# Patient Record
Sex: FEMALE | Race: WHITE | NOT HISPANIC OR LATINO | URBAN - METROPOLITAN AREA
[De-identification: names, ages, dates, MRNs, and addresses within clinical notes are randomized per-mention and may not be internally consistent; named-entity substitution may affect disease eponyms.]

---

## 2020-07-01 ENCOUNTER — OFFICE VISIT (OUTPATIENT)
Dept: URBAN - METROPOLITAN AREA CLINIC 115 | Facility: CLINIC | Age: 65
End: 2020-07-01
Payer: MEDICARE

## 2020-07-01 ENCOUNTER — DASHBOARD ENCOUNTERS (OUTPATIENT)
Age: 65
End: 2020-07-01

## 2020-07-01 DIAGNOSIS — K59.02 CONSTIPATION DUE TO OUTLET SYNDROME: ICD-10-CM

## 2020-07-01 DIAGNOSIS — R11.2 NAUSEA & VOMITING: ICD-10-CM

## 2020-07-01 DIAGNOSIS — Z98.84 OBESITY SURGERY STATUS: ICD-10-CM

## 2020-07-01 DIAGNOSIS — R63.4 WEIGHT LOSS: ICD-10-CM

## 2020-07-01 DIAGNOSIS — K59.03 CONSTIPATION DUE TO OPIOID THERAPY: ICD-10-CM

## 2020-07-01 DIAGNOSIS — G89.4 CHRONIC PAIN DISORDER: ICD-10-CM

## 2020-07-01 DIAGNOSIS — G24.9 DYSTONIA: ICD-10-CM

## 2020-07-01 PROBLEM — 816160009 WEIGHT LOSS: Status: ACTIVE | Noted: 2020-07-01

## 2020-07-01 PROBLEM — 373621006 CHRONIC PAIN SYNDROME: Status: ACTIVE | Noted: 2020-07-01

## 2020-07-01 PROBLEM — 608848006 HISTORY OF BARIATRIC SURGICAL PROCEDURE: Status: ACTIVE | Noted: 2020-07-01

## 2020-07-01 PROBLEM — 15802004 DYSTONIA: Status: ACTIVE | Noted: 2020-07-01

## 2020-07-01 PROBLEM — 85920003 CONSTIPATION BY OUTLET OBSTRUCTION: Status: ACTIVE | Noted: 2020-07-01

## 2020-07-01 PROBLEM — 21782001 DRUG-INDUCED CONSTIPATION: Status: ACTIVE | Noted: 2020-07-01

## 2020-07-01 PROCEDURE — 99214 OFFICE O/P EST MOD 30 MIN: CPT | Performed by: INTERNAL MEDICINE

## 2020-07-01 RX ORDER — ONDANSETRON HYDROCHLORIDE 8 MG/1
HALF TO 1 TABLET AS NEEDED TABLET, FILM COATED ORAL
Qty: 90 TABLET | Refills: 6 | OUTPATIENT
Start: 2020-07-01

## 2020-07-01 RX ORDER — LEVOTHYROXINE SODIUM 88 UG/1
TAKE 1 TABLET (88 MCG) BY ORAL ROUTE ONCE DAILY TABLET ORAL 1
Qty: 0 | Refills: 0 | Status: ACTIVE | COMMUNITY
Start: 1900-01-01

## 2020-07-01 RX ORDER — PANTOPRAZOLE SODIUM 20 MG/1
1 TABLET TABLET, DELAYED RELEASE ORAL TWICE A DAY
Qty: 180 TABLET | Refills: 0 | OUTPATIENT
Start: 2020-07-01

## 2020-07-01 RX ORDER — ONDANSETRON HCL 8 MG
1/2 TAB THREE TIMES A DAY AS NEEDED TABLET ORAL
Qty: 60 | Refills: 5 | Status: ACTIVE | COMMUNITY
Start: 2020-01-21 | End: 2020-07-19

## 2020-07-01 RX ORDER — LUBIPROSTONE 24 UG/1
TAKE 1 CAPSULE (24 MCG) BY ORAL ROUTE 2 TIMES PER DAY WITH FOOD AND WATER CAPSULE, GELATIN COATED ORAL 2
Qty: 0 | Refills: 0 | Status: ON HOLD | COMMUNITY
Start: 1900-01-01

## 2020-07-01 RX ORDER — NALOXEGOL OXALATE 12.5 MG/1
1 TABLET IN THE MORNING TABLET, FILM COATED ORAL ONCE A DAY
Qty: 30 TABLET | Status: ACTIVE | COMMUNITY

## 2020-07-01 RX ORDER — CYPROHEPTADINE HYDROCHLORIDE 4 MG/1
1 TABLET TABLET ORAL TWICE A DAY
Qty: 60 TABLET | Refills: 2 | OUTPATIENT
Start: 2020-07-01

## 2020-07-01 RX ORDER — CARVEDILOL 12.5 MG/1
TABLET, FILM COATED ORAL
Qty: 0 | Refills: 0 | Status: ACTIVE | COMMUNITY
Start: 1900-01-01

## 2020-07-01 RX ORDER — ONDANSETRON 4 MG/1
LET 1 TABLET DISSOLVE IN MOUTH EVERY 8 HOURS AS NEEDED FOR NAUSEA/VOMITING TABLET, ORALLY DISINTEGRATING ORAL
Qty: 0 | Refills: 0 | Status: ACTIVE | COMMUNITY
Start: 2019-01-19

## 2020-07-01 RX ORDER — CLONAZEPAM 1 MG/1
TABLET ORAL
Qty: 0 | Refills: 0 | Status: ACTIVE | COMMUNITY
Start: 1900-01-01

## 2020-07-01 RX ORDER — PANTOPRAZOLE SODIUM 40 MG/1
TAKE 1 TABLET (40 MG) BY ORAL ROUTE ONCE DAILY TABLET, DELAYED RELEASE ORAL 1
Qty: 0 | Refills: 0 | Status: ACTIVE | COMMUNITY
Start: 1900-01-01

## 2020-07-01 RX ORDER — PANTOPRAZOLE SODIUM 40 MG/1
1 TABLET TABLET, DELAYED RELEASE ORAL ONCE A DAY
Qty: 90 TABLET | Refills: 1 | OUTPATIENT
Start: 2020-07-01

## 2020-07-01 NOTE — PHYSICAL EXAM GASTROINTESTINAL
Abdomen , soft, nontender, nondistended , no guarding or rigidity , no masses palpable , normal bowel sounds , Liver and Spleen , no hepatomegaly present , no hepatosplenomegaly , liver nontender , spleen not palpable. g tube site is normal

## 2020-07-01 NOTE — HPI-TODAY'S VISIT:
Ms. Marleni Gramajo was seen after almost a year for complaints of having recurrent abdominal bloating fullness nausea and pain and more weight loss.  Patient stated she has had a hernia surgery done through his surgeon at [facility] and she felt better initially however she for unknown reason she started having bloating discomfort as well as fullness and nausea.  Her nausea medications for Zofran was not I was not covering her throughout the day.  Patient reports with the [employer] receiving from pain clinic her constipation is better.  She had a J-tube that was removed from last April at patient's request , originally it was put in for weight loss. She does admits for extreme anxiety and stress given her son's poor health and also due to COVID  pandemic

## 2020-07-28 ENCOUNTER — ERX REFILL RESPONSE (OUTPATIENT)
Dept: URBAN - METROPOLITAN AREA CLINIC 115 | Facility: CLINIC | Age: 65
End: 2020-07-28

## 2020-07-28 RX ORDER — CYPROHEPTADINE HYDROCHLORIDE 4 MG/1
1 TABLET TABLET ORAL TWICE A DAY
Qty: 60 | Refills: 2 | OUTPATIENT

## 2020-07-28 RX ORDER — CYPROHEPTADINE HYDROCHLORIDE 4 MG/1
TAKE 1 TABLET BY MOUTH TWICE A DAY TABLET ORAL
Qty: 180 TABLET | Refills: 1 | OUTPATIENT

## 2020-10-07 ENCOUNTER — WEB ENCOUNTER (OUTPATIENT)
Dept: URBAN - METROPOLITAN AREA CLINIC 115 | Facility: CLINIC | Age: 65
End: 2020-10-07

## 2020-10-07 ENCOUNTER — WEB ENCOUNTER (OUTPATIENT)
Dept: URBAN - METROPOLITAN AREA CLINIC 82 | Facility: CLINIC | Age: 65
End: 2020-10-07

## 2020-10-07 RX ORDER — PANTOPRAZOLE SODIUM 20 MG/1
1 TABLET TABLET, DELAYED RELEASE ORAL TWICE A DAY
Qty: 180
Start: 2020-07-01

## 2021-02-10 ENCOUNTER — TELEPHONE ENCOUNTER (OUTPATIENT)
Dept: URBAN - METROPOLITAN AREA CLINIC 115 | Facility: CLINIC | Age: 66
End: 2021-02-10

## 2021-02-10 RX ORDER — PANTOPRAZOLE SODIUM 20 MG/1
1 TABLET TABLET, DELAYED RELEASE ORAL TWICE A DAY
Qty: 180
Start: 2020-07-01

## 2021-02-11 ENCOUNTER — WEB ENCOUNTER (OUTPATIENT)
Dept: URBAN - METROPOLITAN AREA CLINIC 115 | Facility: CLINIC | Age: 66
End: 2021-02-11

## 2021-02-25 ENCOUNTER — TELEPHONE ENCOUNTER (OUTPATIENT)
Dept: URBAN - METROPOLITAN AREA CLINIC 115 | Facility: CLINIC | Age: 66
End: 2021-02-25

## 2021-02-25 ENCOUNTER — OFFICE VISIT (OUTPATIENT)
Dept: URBAN - METROPOLITAN AREA CLINIC 115 | Facility: CLINIC | Age: 66
End: 2021-02-25

## 2021-02-25 RX ORDER — PANTOPRAZOLE SODIUM 20 MG/1
1 TABLET TABLET, DELAYED RELEASE ORAL TWICE A DAY
Qty: 180 | Refills: 0
Start: 2020-07-01

## 2021-02-25 RX ORDER — ONDANSETRON HYDROCHLORIDE 8 MG/1
HALF TO 1 TABLET AS NEEDED TABLET, FILM COATED ORAL ONCE A DAY
Qty: 90 TABLET | Refills: 1

## 2025-02-21 ENCOUNTER — CLAIMS CREATED FROM THE CLAIM WINDOW (OUTPATIENT)
Dept: URBAN - METROPOLITAN AREA MEDICAL CENTER 24 | Facility: MEDICAL CENTER | Age: 70
End: 2025-02-21
Payer: MEDICARE

## 2025-02-21 DIAGNOSIS — Z98.84 BARIATRIC SURG STAT-UNSP: ICD-10-CM

## 2025-02-21 DIAGNOSIS — K80.20 ASYMPTOMATIC CHOLELITHIASIS: ICD-10-CM

## 2025-02-21 DIAGNOSIS — K83.8 ACQUIRED DILATION OF BILE DUCT: ICD-10-CM

## 2025-02-21 PROCEDURE — 99222 1ST HOSP IP/OBS MODERATE 55: CPT | Performed by: PHYSICIAN ASSISTANT

## 2025-02-21 PROCEDURE — 99254 IP/OBS CNSLTJ NEW/EST MOD 60: CPT | Performed by: PHYSICIAN ASSISTANT

## 2025-02-21 PROCEDURE — G8427 DOCREV CUR MEDS BY ELIG CLIN: HCPCS | Performed by: PHYSICIAN ASSISTANT

## 2025-02-22 ENCOUNTER — CLAIMS CREATED FROM THE CLAIM WINDOW (OUTPATIENT)
Dept: URBAN - METROPOLITAN AREA MEDICAL CENTER 24 | Facility: MEDICAL CENTER | Age: 70
End: 2025-02-22
Payer: MEDICARE

## 2025-02-22 DIAGNOSIS — Z98.84 BARIATRIC SURG STAT-UNSP: ICD-10-CM

## 2025-02-22 DIAGNOSIS — K83.8 ACQUIRED DILATION OF BILE DUCT: ICD-10-CM

## 2025-02-22 DIAGNOSIS — K80.20 ASYMPTOMATIC CHOLELITHIASIS: ICD-10-CM

## 2025-02-22 PROCEDURE — 99232 SBSQ HOSP IP/OBS MODERATE 35: CPT

## 2025-02-23 ENCOUNTER — CLAIMS CREATED FROM THE CLAIM WINDOW (OUTPATIENT)
Dept: URBAN - METROPOLITAN AREA MEDICAL CENTER 24 | Facility: MEDICAL CENTER | Age: 70
End: 2025-02-23
Payer: MEDICARE

## 2025-02-23 DIAGNOSIS — K80.20 ASYMPTOMATIC CHOLELITHIASIS: ICD-10-CM

## 2025-02-23 DIAGNOSIS — Z98.84 BARIATRIC SURG STAT-UNSP: ICD-10-CM

## 2025-02-23 DIAGNOSIS — K83.8 ACQUIRED DILATION OF BILE DUCT: ICD-10-CM

## 2025-02-23 PROCEDURE — 99232 SBSQ HOSP IP/OBS MODERATE 35: CPT

## 2025-02-24 ENCOUNTER — CLAIMS CREATED FROM THE CLAIM WINDOW (OUTPATIENT)
Dept: URBAN - METROPOLITAN AREA MEDICAL CENTER 24 | Facility: MEDICAL CENTER | Age: 70
End: 2025-02-24
Payer: MEDICARE

## 2025-02-24 DIAGNOSIS — Z98.84 BARIATRIC SURG STAT-UNSP: ICD-10-CM

## 2025-02-24 DIAGNOSIS — K80.70 CALCULUS OF GALLBLADDER AND BILE DUCT WITHOUT CHOLECYSTITIS OR OBSTRUCTION: ICD-10-CM

## 2025-02-24 PROCEDURE — 43259 EGD US EXAM DUODENUM/JEJUNUM: CPT | Performed by: STUDENT IN AN ORGANIZED HEALTH CARE EDUCATION/TRAINING PROGRAM

## 2025-02-24 PROCEDURE — 74328 X-RAY BILE DUCT ENDOSCOPY: CPT | Performed by: STUDENT IN AN ORGANIZED HEALTH CARE EDUCATION/TRAINING PROGRAM

## 2025-02-24 PROCEDURE — 43242 EGD US FINE NEEDLE BX/ASPIR: CPT | Performed by: STUDENT IN AN ORGANIZED HEALTH CARE EDUCATION/TRAINING PROGRAM

## 2025-02-24 PROCEDURE — 43264 ERCP REMOVE DUCT CALCULI: CPT | Performed by: STUDENT IN AN ORGANIZED HEALTH CARE EDUCATION/TRAINING PROGRAM

## 2025-02-24 PROCEDURE — 43262 ENDO CHOLANGIOPANCREATOGRAPH: CPT | Performed by: STUDENT IN AN ORGANIZED HEALTH CARE EDUCATION/TRAINING PROGRAM

## 2025-02-25 ENCOUNTER — CLAIMS CREATED FROM THE CLAIM WINDOW (OUTPATIENT)
Dept: URBAN - METROPOLITAN AREA MEDICAL CENTER 24 | Facility: MEDICAL CENTER | Age: 70
End: 2025-02-25
Payer: MEDICARE

## 2025-02-25 DIAGNOSIS — Z98.84 BARIATRIC SURG STAT-UNSP: ICD-10-CM

## 2025-02-25 DIAGNOSIS — K80.50 BILE DUCT CALCULUS: ICD-10-CM

## 2025-02-25 PROCEDURE — 74328 X-RAY BILE DUCT ENDOSCOPY: CPT | Performed by: STUDENT IN AN ORGANIZED HEALTH CARE EDUCATION/TRAINING PROGRAM

## 2025-02-25 PROCEDURE — 43262 ENDO CHOLANGIOPANCREATOGRAPH: CPT | Performed by: STUDENT IN AN ORGANIZED HEALTH CARE EDUCATION/TRAINING PROGRAM

## 2025-02-25 PROCEDURE — 43264 ERCP REMOVE DUCT CALCULI: CPT | Performed by: STUDENT IN AN ORGANIZED HEALTH CARE EDUCATION/TRAINING PROGRAM

## 2025-02-26 ENCOUNTER — CLAIMS CREATED FROM THE CLAIM WINDOW (OUTPATIENT)
Dept: URBAN - METROPOLITAN AREA MEDICAL CENTER 24 | Facility: MEDICAL CENTER | Age: 70
End: 2025-02-26

## 2025-02-26 PROCEDURE — 99232 SBSQ HOSP IP/OBS MODERATE 35: CPT | Performed by: PHYSICIAN ASSISTANT

## 2025-03-04 ENCOUNTER — TELEPHONE ENCOUNTER (OUTPATIENT)
Dept: URBAN - METROPOLITAN AREA CLINIC 115 | Facility: CLINIC | Age: 70
End: 2025-03-04

## 2025-04-24 ENCOUNTER — OFFICE VISIT (OUTPATIENT)
Dept: URBAN - METROPOLITAN AREA CLINIC 82 | Facility: CLINIC | Age: 70
End: 2025-04-24
Payer: MEDICARE

## 2025-04-24 ENCOUNTER — LAB OUTSIDE AN ENCOUNTER (OUTPATIENT)
Dept: URBAN - METROPOLITAN AREA CLINIC 82 | Facility: CLINIC | Age: 70
End: 2025-04-24

## 2025-04-24 DIAGNOSIS — R10.84 GENERALIZED ABDOMINAL PAIN: ICD-10-CM

## 2025-04-24 DIAGNOSIS — K80.50 CHOLEDOCHOLITHIASIS: ICD-10-CM

## 2025-04-24 DIAGNOSIS — K83.8 COMMON BILE DUCT DILATATION: ICD-10-CM

## 2025-04-24 DIAGNOSIS — R11.2 NAUSEA AND VOMITING IN ADULT: ICD-10-CM

## 2025-04-24 PROBLEM — 123608004: Status: ACTIVE | Noted: 2025-04-24

## 2025-04-24 PROBLEM — 102614006: Status: ACTIVE | Noted: 2025-04-24

## 2025-04-24 PROBLEM — 161615003: Status: ACTIVE | Noted: 2025-04-24

## 2025-04-24 PROBLEM — 307132003: Status: ACTIVE | Noted: 2025-04-24

## 2025-04-24 PROCEDURE — 99213 OFFICE O/P EST LOW 20 MIN: CPT | Performed by: STUDENT IN AN ORGANIZED HEALTH CARE EDUCATION/TRAINING PROGRAM

## 2025-04-24 RX ORDER — LEVOTHYROXINE SODIUM 88 UG/1
TAKE 1 TABLET (88 MCG) BY ORAL ROUTE ONCE DAILY TABLET ORAL 1
Qty: 0 | Refills: 0 | Status: ACTIVE | COMMUNITY
Start: 1900-01-01

## 2025-04-24 RX ORDER — PANTOPRAZOLE SODIUM 40 MG/1
1 TABLET TABLET, DELAYED RELEASE ORAL ONCE A DAY
Qty: 90 TABLET | Refills: 1 | Status: ACTIVE | COMMUNITY
Start: 2020-07-01

## 2025-04-24 RX ORDER — PANTOPRAZOLE SODIUM 20 MG/1
1 TABLET TABLET, DELAYED RELEASE ORAL TWICE A DAY
Qty: 180 | Refills: 0 | Status: ACTIVE | COMMUNITY
Start: 2020-07-01

## 2025-04-24 RX ORDER — NALOXEGOL OXALATE 12.5 MG/1
1 TABLET IN THE MORNING TABLET, FILM COATED ORAL ONCE A DAY
Qty: 30 TABLET | Status: ACTIVE | COMMUNITY

## 2025-04-24 RX ORDER — LUBIPROSTONE 24 UG/1
TAKE 1 CAPSULE (24 MCG) BY ORAL ROUTE 2 TIMES PER DAY WITH FOOD AND WATER CAPSULE, GELATIN COATED ORAL 2
Qty: 0 | Refills: 0 | Status: ON HOLD | COMMUNITY
Start: 1900-01-01

## 2025-04-24 RX ORDER — CARVEDILOL 12.5 MG/1
TABLET, FILM COATED ORAL
Qty: 0 | Refills: 0 | Status: ACTIVE | COMMUNITY
Start: 1900-01-01

## 2025-04-24 RX ORDER — CYPROHEPTADINE HYDROCHLORIDE 4 MG/1
TAKE 1 TABLET BY MOUTH TWICE A DAY TABLET ORAL
Qty: 180 TABLET | Refills: 1 | Status: ACTIVE | COMMUNITY

## 2025-04-24 RX ORDER — CLONAZEPAM 1 MG/1
TABLET ORAL
Qty: 0 | Refills: 0 | Status: ACTIVE | COMMUNITY
Start: 1900-01-01

## 2025-04-24 RX ORDER — ONDANSETRON HYDROCHLORIDE 8 MG/1
HALF TO 1 TABLET AS NEEDED TABLET, FILM COATED ORAL ONCE A DAY
Qty: 90 TABLET | Refills: 1 | Status: ACTIVE | COMMUNITY

## 2025-04-24 RX ORDER — PANTOPRAZOLE SODIUM 40 MG/1
TAKE 1 TABLET (40 MG) BY ORAL ROUTE ONCE DAILY TABLET, DELAYED RELEASE ORAL 1
Qty: 0 | Refills: 0 | Status: ACTIVE | COMMUNITY
Start: 1900-01-01

## 2025-04-24 RX ORDER — ONDANSETRON 4 MG/1
LET 1 TABLET DISSOLVE IN MOUTH EVERY 8 HOURS AS NEEDED FOR NAUSEA/VOMITING TABLET, ORALLY DISINTEGRATING ORAL
Qty: 0 | Refills: 0 | Status: ACTIVE | COMMUNITY
Start: 2019-01-19

## 2025-04-24 NOTE — HPI-TODAY'S VISIT:
4/24/25 70 y.o female w PMH of seizure, COPD, gastric bypass presents today for F/U after being admitted to [facility] on 02/2024 for symptomatic cholelithiasis and choledocholithiasis. MRI 02/2025 shows no stone, has dilated ducts. EUS s/p ERCP w/ CBD stones removed. Undergo lap alyssa. Patient states that she still deals w/ great abd pain since hospital d.c. Still deals w/ abd pain, NV more post prandial. She went back to the general surgeon, was told a stent could have remained after the ERCP, was told to go back to GI. She is currently still on pantoprazole, ondansetron and dicyclomine to help w/ the above sx.

## 2025-04-25 ENCOUNTER — TELEPHONE ENCOUNTER (OUTPATIENT)
Dept: URBAN - METROPOLITAN AREA CLINIC 82 | Facility: CLINIC | Age: 70
End: 2025-04-25

## 2025-04-25 LAB
ALBUMIN/GLOBULIN RATIO: 1.7
ALBUMIN: 4
ALKALINE PHOSPHATASE: 83
ALT (SGPT): 4
AST (SGOT): 10
BILIRUBIN, DIRECT: 0.1
BILIRUBIN, INDIRECT: 0.2
BILIRUBIN, TOTAL: 0.3
GLOBULIN: 2.3
LIPASE: 17
PROTEIN, TOTAL: 6.3

## 2025-05-27 ENCOUNTER — OFFICE VISIT (OUTPATIENT)
Dept: URBAN - METROPOLITAN AREA CLINIC 115 | Facility: CLINIC | Age: 70
End: 2025-05-27